# Patient Record
Sex: FEMALE | Race: WHITE | NOT HISPANIC OR LATINO | Employment: OTHER | URBAN - METROPOLITAN AREA
[De-identification: names, ages, dates, MRNs, and addresses within clinical notes are randomized per-mention and may not be internally consistent; named-entity substitution may affect disease eponyms.]

---

## 2019-05-09 ENCOUNTER — IMPORTED ENCOUNTER (OUTPATIENT)
Dept: URBAN - METROPOLITAN AREA CLINIC 50 | Facility: CLINIC | Age: 59
End: 2019-05-09

## 2020-05-12 ENCOUNTER — IMPORTED ENCOUNTER (OUTPATIENT)
Dept: URBAN - METROPOLITAN AREA CLINIC 50 | Facility: CLINIC | Age: 60
End: 2020-05-12

## 2021-04-17 ASSESSMENT — VISUAL ACUITY
OD_OTHER: 20/30. 20/40.
OS_BAT: 20/40
OS_BAT: 20/25-1
OS_CC: 20/30-2
OD_BAT: 20/25-1
OS_CC: 20/25-1
OD_CC: J1
OS_CC: J1
OS_OTHER: 20/40. 20/50.
OS_CC: J1+
OD_CC: 20/25-1
OD_OTHER: 20/25-1. 20/25-2.
OD_BAT: 20/30
OS_OTHER: 20/25-1. 20/25-2.
OD_CC: 20/25-1
OD_CC: J1+

## 2021-04-17 ASSESSMENT — TONOMETRY
OS_IOP_MMHG: 16
OD_IOP_MMHG: 15
OS_IOP_MMHG: 15
OD_IOP_MMHG: 15

## 2021-05-20 ENCOUNTER — PREPPED CHART (OUTPATIENT)
Dept: URBAN - METROPOLITAN AREA CLINIC 53 | Facility: CLINIC | Age: 61
End: 2021-05-20

## 2021-05-24 ENCOUNTER — ROUTINE EXAM (OUTPATIENT)
Dept: URBAN - METROPOLITAN AREA CLINIC 53 | Facility: CLINIC | Age: 61
End: 2021-05-24

## 2021-05-24 DIAGNOSIS — H52.4: ICD-10-CM

## 2021-05-24 DIAGNOSIS — H25.13: ICD-10-CM

## 2021-05-24 DIAGNOSIS — Z01.00: ICD-10-CM

## 2021-05-24 PROCEDURE — 92015 DETERMINE REFRACTIVE STATE: CPT

## 2021-05-24 PROCEDURE — 92014 COMPRE OPH EXAM EST PT 1/>: CPT

## 2021-05-24 ASSESSMENT — VISUAL ACUITY
OU_CC: J1+ @ 16"
OD_GLARE: 20/25
OS_CC: 20/25-2
OS_GLARE: 20/30
OD_GLARE: 20/30
OS_GLARE: 20/30
OD_CC: 20/25

## 2021-05-24 ASSESSMENT — TONOMETRY
OD_IOP_MMHG: 15
OS_IOP_MMHG: 15

## 2022-05-23 ENCOUNTER — COMPREHENSIVE EXAM (OUTPATIENT)
Dept: URBAN - METROPOLITAN AREA CLINIC 53 | Facility: CLINIC | Age: 62
End: 2022-05-23

## 2022-05-23 DIAGNOSIS — H52.4: ICD-10-CM

## 2022-05-23 DIAGNOSIS — Z01.01: ICD-10-CM

## 2022-05-23 DIAGNOSIS — H25.13: ICD-10-CM

## 2022-05-23 PROCEDURE — 92015 DETERMINE REFRACTIVE STATE: CPT

## 2022-05-23 PROCEDURE — 92014 COMPRE OPH EXAM EST PT 1/>: CPT

## 2022-05-23 ASSESSMENT — KERATOMETRY
OS_K1POWER_DIOPTERS: 42.75
OD_AXISANGLE2_DEGREES: 175
OS_AXISANGLE2_DEGREES: 162
OD_K1POWER_DIOPTERS: 42.75
OD_K2POWER_DIOPTERS: 43.50
OD_AXISANGLE_DEGREES: 085
OS_AXISANGLE_DEGREES: 072
OS_K2POWER_DIOPTERS: 43.50

## 2022-05-23 ASSESSMENT — VISUAL ACUITY
OS_GLARE: 20/40
OD_CC: 20/30-2
OD_SC: 20/70
OS_CC: 20/25
OD_CC: J1+
OS_GLARE: 20/30
OS_CC: J1+
OD_GLARE: 20/40
OS_SC: 20/80
OD_GLARE: 20/40

## 2022-05-23 ASSESSMENT — TONOMETRY
OD_IOP_MMHG: 13
OS_IOP_MMHG: 14

## 2023-05-24 ENCOUNTER — COMPREHENSIVE EXAM (OUTPATIENT)
Dept: URBAN - METROPOLITAN AREA CLINIC 53 | Facility: CLINIC | Age: 63
End: 2023-05-24

## 2023-05-24 DIAGNOSIS — Z01.01: ICD-10-CM

## 2023-05-24 DIAGNOSIS — H52.4: ICD-10-CM

## 2023-05-24 DIAGNOSIS — H25.13: ICD-10-CM

## 2023-05-24 PROCEDURE — 92015 DETERMINE REFRACTIVE STATE: CPT

## 2023-05-24 PROCEDURE — 92014 COMPRE OPH EXAM EST PT 1/>: CPT

## 2023-05-24 ASSESSMENT — VISUAL ACUITY
OS_CC: 20/30
OS_GLARE: 20/20
OD_CC: 20/25-2
OD_GLARE: 20/25
OS_PH: 20/25
OU_CC: J1+@16"
OD_GLARE: 20/25
OS_GLARE: 20/20

## 2023-05-24 ASSESSMENT — KERATOMETRY
OS_K2POWER_DIOPTERS: 43.50
OD_AXISANGLE_DEGREES: 085
OD_AXISANGLE2_DEGREES: 175
OS_K1POWER_DIOPTERS: 42.75
OS_AXISANGLE_DEGREES: 072
OD_K1POWER_DIOPTERS: 42.75
OD_K2POWER_DIOPTERS: 43.50
OS_AXISANGLE2_DEGREES: 162

## 2023-05-24 ASSESSMENT — TONOMETRY
OD_IOP_MMHG: 14
OS_IOP_MMHG: 15

## 2024-07-09 ENCOUNTER — COMPREHENSIVE EXAM (OUTPATIENT)
Dept: URBAN - METROPOLITAN AREA CLINIC 53 | Facility: CLINIC | Age: 64
End: 2024-07-09

## 2024-07-09 DIAGNOSIS — D23.111: ICD-10-CM

## 2024-07-09 DIAGNOSIS — Z01.00: ICD-10-CM

## 2024-07-09 DIAGNOSIS — H25.13: ICD-10-CM

## 2024-07-09 DIAGNOSIS — H52.4: ICD-10-CM

## 2024-07-09 PROCEDURE — 92014 COMPRE OPH EXAM EST PT 1/>: CPT

## 2024-07-09 PROCEDURE — 92015 DETERMINE REFRACTIVE STATE: CPT

## 2024-07-09 ASSESSMENT — TONOMETRY
OD_IOP_MMHG: 15
OS_IOP_MMHG: 15

## 2024-07-09 ASSESSMENT — KERATOMETRY
OD_K2POWER_DIOPTERS: 43.75
OD_K1POWER_DIOPTERS: 43.25
OS_K1POWER_DIOPTERS: 43.00
OS_K2POWER_DIOPTERS: 43.75
OS_AXISANGLE2_DEGREES: 148
OS_AXISANGLE_DEGREES: 058
OD_AXISANGLE_DEGREES: 061
OD_AXISANGLE2_DEGREES: 151

## 2024-07-09 ASSESSMENT — VISUAL ACUITY
OU_CC: J1+
OS_CC: 20/25
OS_GLARE: 20/25
OD_GLARE: 20/30
OS_GLARE: 20/25
OD_CC: 20/30
OD_GLARE: 20/30
OU_CC: 20/25

## 2025-04-09 NOTE — PATIENT DISCUSSION
"""Informed patient that their cataract(s) are not visually significant or do not meet the criteria for cataract surgery.  Recommended attention to common cataract symptoms Leonor BOWMAN (1977) initiated an asynchronous digital communication through [employer].  Date of service: 4/9/2025     HPI: per patient's questionnaire    EXAM: not applicable    Diagnoses and all orders for this visit:  Assessment & Plan  Fear of flying   Acute condition, recurrent, Supportive care with appropriate antipyretics and fluids.      Controlled Substance Monitoring:    Acute and Chronic Pain Monitoring:   RX Monitoring Acute Pain Prescriptions Periodic Controlled Substance Monitoring   4/9/2025   2:00 PM Not required given exclusionary diagnoses... Assessed functional status (ability to engage in work or other purposeful activities, the pain intensity and its interference with activities of daily living, quality of family life and social activities, and the physical activity)         Orders:    LORazepam (ATIVAN) 0.5 MG tablet; Take 1 tablet by mouth every 8 hours as needed for Anxiety for up to 2 days. Max Daily Amount: 1.5 mg        Patient was advised to contact PCP if symptoms worsen or failing to change as expected      Time: EV2 - 11-20 minutes were spent on the digital evaluation and management of this patient.     Electronically signed by Rolo Elias MD on 4/9/25 at 1:59 PM.

## 2025-07-16 ENCOUNTER — COMPREHENSIVE EXAM (OUTPATIENT)
Age: 65
End: 2025-07-16

## 2025-07-16 DIAGNOSIS — H25.13: ICD-10-CM

## 2025-07-16 DIAGNOSIS — D23.111: ICD-10-CM

## 2025-07-16 DIAGNOSIS — H52.4: ICD-10-CM

## 2025-07-16 DIAGNOSIS — Z01.00: ICD-10-CM

## 2025-07-16 PROCEDURE — 92015 DETERMINE REFRACTIVE STATE: CPT

## 2025-07-16 PROCEDURE — 92014 COMPRE OPH EXAM EST PT 1/>: CPT

## 2025-07-16 PROCEDURE — 76514 ECHO EXAM OF EYE THICKNESS: CPT | Mod: NC
